# Patient Record
Sex: FEMALE | Race: WHITE | NOT HISPANIC OR LATINO | Employment: FULL TIME | URBAN - METROPOLITAN AREA
[De-identification: names, ages, dates, MRNs, and addresses within clinical notes are randomized per-mention and may not be internally consistent; named-entity substitution may affect disease eponyms.]

---

## 2017-06-26 ENCOUNTER — COMMUNICATION - HEALTHEAST (OUTPATIENT)
Dept: FAMILY MEDICINE | Facility: CLINIC | Age: 32
End: 2017-06-26

## 2017-08-30 ENCOUNTER — COMMUNICATION - HEALTHEAST (OUTPATIENT)
Dept: FAMILY MEDICINE | Facility: CLINIC | Age: 32
End: 2017-08-30

## 2017-09-01 ENCOUNTER — COMMUNICATION - HEALTHEAST (OUTPATIENT)
Dept: FAMILY MEDICINE | Facility: CLINIC | Age: 32
End: 2017-09-01

## 2017-10-26 ENCOUNTER — OFFICE VISIT - HEALTHEAST (OUTPATIENT)
Dept: FAMILY MEDICINE | Facility: CLINIC | Age: 32
End: 2017-10-26

## 2017-10-26 DIAGNOSIS — Z23 NEED FOR VACCINATION: ICD-10-CM

## 2017-10-26 DIAGNOSIS — D22.9 CHANGE IN MOLE: ICD-10-CM

## 2017-10-26 DIAGNOSIS — E66.3 OVERWEIGHT (BMI 25.0-29.9): ICD-10-CM

## 2017-10-26 DIAGNOSIS — R21 RASH: ICD-10-CM

## 2017-10-26 DIAGNOSIS — Z00.00 ANNUAL PHYSICAL EXAM: ICD-10-CM

## 2017-10-26 ASSESSMENT — MIFFLIN-ST. JEOR: SCORE: 1450.44

## 2017-10-27 LAB
CHOLEST SERPL-MCNC: 208 MG/DL
FASTING STATUS PATIENT QL REPORTED: NO
HDLC SERPL-MCNC: 54 MG/DL
LDLC SERPL CALC-MCNC: 134 MG/DL
TRIGL SERPL-MCNC: 98 MG/DL

## 2017-10-31 ENCOUNTER — COMMUNICATION - HEALTHEAST (OUTPATIENT)
Dept: FAMILY MEDICINE | Facility: CLINIC | Age: 32
End: 2017-10-31

## 2017-11-08 ENCOUNTER — COMMUNICATION - HEALTHEAST (OUTPATIENT)
Dept: ONCOLOGY | Facility: HOSPITAL | Age: 32
End: 2017-11-08

## 2017-11-20 ENCOUNTER — COMMUNICATION - HEALTHEAST (OUTPATIENT)
Dept: FAMILY MEDICINE | Facility: CLINIC | Age: 32
End: 2017-11-20

## 2017-11-20 ENCOUNTER — COMMUNICATION - HEALTHEAST (OUTPATIENT)
Dept: ONCOLOGY | Facility: HOSPITAL | Age: 32
End: 2017-11-20

## 2017-12-22 ENCOUNTER — RECORDS - HEALTHEAST (OUTPATIENT)
Dept: ADMINISTRATIVE | Facility: OTHER | Age: 32
End: 2017-12-22

## 2018-03-14 ENCOUNTER — RECORDS - HEALTHEAST (OUTPATIENT)
Dept: LAB | Facility: CLINIC | Age: 33
End: 2018-03-14

## 2018-03-16 LAB — BACTERIA SPEC CULT: NORMAL

## 2018-05-22 ENCOUNTER — OFFICE VISIT - HEALTHEAST (OUTPATIENT)
Dept: FAMILY MEDICINE | Facility: CLINIC | Age: 33
End: 2018-05-22

## 2018-05-22 DIAGNOSIS — Z30.09 ENCOUNTER FOR OTHER GENERAL COUNSELING OR ADVICE ON CONTRACEPTION: ICD-10-CM

## 2018-05-22 ASSESSMENT — MIFFLIN-ST. JEOR: SCORE: 1505.16

## 2018-10-27 ENCOUNTER — AMBULATORY - HEALTHEAST (OUTPATIENT)
Dept: NURSING | Facility: CLINIC | Age: 33
End: 2018-10-27

## 2018-10-28 ENCOUNTER — COMMUNICATION - HEALTHEAST (OUTPATIENT)
Dept: FAMILY MEDICINE | Facility: CLINIC | Age: 33
End: 2018-10-28

## 2019-02-15 ENCOUNTER — RECORDS - HEALTHEAST (OUTPATIENT)
Dept: ADMINISTRATIVE | Facility: OTHER | Age: 34
End: 2019-02-15

## 2019-03-11 ENCOUNTER — RECORDS - HEALTHEAST (OUTPATIENT)
Dept: ADMINISTRATIVE | Facility: OTHER | Age: 34
End: 2019-03-11

## 2019-03-19 ENCOUNTER — COMMUNICATION - HEALTHEAST (OUTPATIENT)
Dept: FAMILY MEDICINE | Facility: CLINIC | Age: 34
End: 2019-03-19

## 2019-05-09 ENCOUNTER — OFFICE VISIT - HEALTHEAST (OUTPATIENT)
Dept: FAMILY MEDICINE | Facility: CLINIC | Age: 34
End: 2019-05-09

## 2019-05-09 DIAGNOSIS — Z13.1 SCREENING FOR DIABETES MELLITUS: ICD-10-CM

## 2019-05-09 DIAGNOSIS — G43.109 MIGRAINE WITH AURA AND WITHOUT STATUS MIGRAINOSUS, NOT INTRACTABLE: ICD-10-CM

## 2019-05-09 DIAGNOSIS — Z13.6 SCREENING FOR CARDIOVASCULAR CONDITION: ICD-10-CM

## 2019-05-09 DIAGNOSIS — Z00.00 ROUTINE ADULT HEALTH MAINTENANCE: ICD-10-CM

## 2019-05-09 DIAGNOSIS — Z30.41 ENCOUNTER FOR SURVEILLANCE OF CONTRACEPTIVE PILLS: ICD-10-CM

## 2019-06-04 ENCOUNTER — COMMUNICATION - HEALTHEAST (OUTPATIENT)
Dept: FAMILY MEDICINE | Facility: CLINIC | Age: 34
End: 2019-06-04

## 2019-06-04 DIAGNOSIS — Z30.09 ENCOUNTER FOR OTHER GENERAL COUNSELING OR ADVICE ON CONTRACEPTION: ICD-10-CM

## 2020-03-20 ENCOUNTER — COMMUNICATION - HEALTHEAST (OUTPATIENT)
Dept: SCHEDULING | Facility: CLINIC | Age: 35
End: 2020-03-20

## 2020-05-16 ENCOUNTER — COMMUNICATION - HEALTHEAST (OUTPATIENT)
Dept: FAMILY MEDICINE | Facility: CLINIC | Age: 35
End: 2020-05-16

## 2020-05-16 DIAGNOSIS — Z30.09 ENCOUNTER FOR OTHER GENERAL COUNSELING OR ADVICE ON CONTRACEPTION: ICD-10-CM

## 2020-05-29 ENCOUNTER — COMMUNICATION - HEALTHEAST (OUTPATIENT)
Dept: FAMILY MEDICINE | Facility: CLINIC | Age: 35
End: 2020-05-29

## 2020-07-21 ENCOUNTER — COMMUNICATION - HEALTHEAST (OUTPATIENT)
Dept: FAMILY MEDICINE | Facility: CLINIC | Age: 35
End: 2020-07-21

## 2020-07-21 DIAGNOSIS — Z13.1 SCREENING FOR DIABETES MELLITUS: ICD-10-CM

## 2020-07-21 DIAGNOSIS — Z13.6 SCREENING FOR CARDIOVASCULAR CONDITION: ICD-10-CM

## 2020-07-23 ENCOUNTER — OFFICE VISIT - HEALTHEAST (OUTPATIENT)
Dept: FAMILY MEDICINE | Facility: CLINIC | Age: 35
End: 2020-07-23

## 2020-07-23 ENCOUNTER — AMBULATORY - HEALTHEAST (OUTPATIENT)
Dept: LAB | Facility: CLINIC | Age: 35
End: 2020-07-23

## 2020-07-23 DIAGNOSIS — Z13.6 SCREENING FOR CARDIOVASCULAR CONDITION: ICD-10-CM

## 2020-07-23 DIAGNOSIS — G43.109 MIGRAINE WITH AURA AND WITHOUT STATUS MIGRAINOSUS, NOT INTRACTABLE: ICD-10-CM

## 2020-07-23 DIAGNOSIS — Z12.4 SCREENING FOR MALIGNANT NEOPLASM OF CERVIX: ICD-10-CM

## 2020-07-23 DIAGNOSIS — Z13.1 SCREENING FOR DIABETES MELLITUS: ICD-10-CM

## 2020-07-23 DIAGNOSIS — E66.811 CLASS 1 OBESITY WITHOUT SERIOUS COMORBIDITY WITH BODY MASS INDEX (BMI) OF 34.0 TO 34.9 IN ADULT, UNSPECIFIED OBESITY TYPE: ICD-10-CM

## 2020-07-23 DIAGNOSIS — Z00.00 ROUTINE ADULT HEALTH MAINTENANCE: ICD-10-CM

## 2020-07-23 LAB
CHOLEST SERPL-MCNC: 206 MG/DL
FASTING STATUS PATIENT QL REPORTED: YES
FASTING STATUS PATIENT QL REPORTED: YES
GLUCOSE BLD-MCNC: 74 MG/DL (ref 79–116)
HDLC SERPL-MCNC: 54 MG/DL
LDLC SERPL CALC-MCNC: 118 MG/DL
TRIGL SERPL-MCNC: 171 MG/DL

## 2020-07-23 ASSESSMENT — ANXIETY QUESTIONNAIRES
7. FEELING AFRAID AS IF SOMETHING AWFUL MIGHT HAPPEN: NOT AT ALL
1. FEELING NERVOUS, ANXIOUS, OR ON EDGE: SEVERAL DAYS
2. NOT BEING ABLE TO STOP OR CONTROL WORRYING: NOT AT ALL
6. BECOMING EASILY ANNOYED OR IRRITABLE: NOT AT ALL
GAD7 TOTAL SCORE: 4
4. TROUBLE RELAXING: SEVERAL DAYS
5. BEING SO RESTLESS THAT IT IS HARD TO SIT STILL: SEVERAL DAYS
IF YOU CHECKED OFF ANY PROBLEMS ON THIS QUESTIONNAIRE, HOW DIFFICULT HAVE THESE PROBLEMS MADE IT FOR YOU TO DO YOUR WORK, TAKE CARE OF THINGS AT HOME, OR GET ALONG WITH OTHER PEOPLE: NOT DIFFICULT AT ALL
3. WORRYING TOO MUCH ABOUT DIFFERENT THINGS: SEVERAL DAYS

## 2020-07-27 LAB
HPV SOURCE: NORMAL
HUMAN PAPILLOMA VIRUS 16 DNA: NEGATIVE
HUMAN PAPILLOMA VIRUS 18 DNA: NEGATIVE
HUMAN PAPILLOMA VIRUS FINAL DIAGNOSIS: NORMAL
HUMAN PAPILLOMA VIRUS OTHER HR: NEGATIVE
SPECIMEN DESCRIPTION: NORMAL

## 2020-08-05 LAB
BKR LAB AP ABNORMAL BLEEDING: NO
BKR LAB AP BIRTH CONTROL/HORMONES: NORMAL
BKR LAB AP CERVICAL APPEARANCE: NORMAL
BKR LAB AP GYN ADEQUACY: NORMAL
BKR LAB AP GYN INTERPRETATION: NORMAL
BKR LAB AP HPV REFLEX: NORMAL
BKR LAB AP LMP: NORMAL
BKR LAB AP PATIENT STATUS: NORMAL
BKR LAB AP PREVIOUS ABNORMAL: NORMAL
BKR LAB AP PREVIOUS NORMAL: 2016
HIGH RISK?: NO
PATH REPORT.COMMENTS IMP SPEC: NORMAL
RESULT FLAG (HE HISTORICAL CONVERSION): NORMAL

## 2020-10-13 ENCOUNTER — AMBULATORY - HEALTHEAST (OUTPATIENT)
Dept: NURSING | Facility: CLINIC | Age: 35
End: 2020-10-13

## 2021-01-25 ENCOUNTER — OFFICE VISIT - HEALTHEAST (OUTPATIENT)
Dept: FAMILY MEDICINE | Facility: CLINIC | Age: 36
End: 2021-01-25

## 2021-01-25 ENCOUNTER — COMMUNICATION - HEALTHEAST (OUTPATIENT)
Dept: FAMILY MEDICINE | Facility: CLINIC | Age: 36
End: 2021-01-25

## 2021-01-25 DIAGNOSIS — N89.8 VAGINAL DISCHARGE: ICD-10-CM

## 2021-01-25 LAB
CLUE CELLS: NORMAL
TRICHOMONAS, WET PREP: NORMAL
YEAST, WET PREP: NORMAL

## 2021-01-25 ASSESSMENT — MIFFLIN-ST. JEOR: SCORE: 1552.51

## 2021-01-26 ENCOUNTER — COMMUNICATION - HEALTHEAST (OUTPATIENT)
Dept: FAMILY MEDICINE | Facility: CLINIC | Age: 36
End: 2021-01-26

## 2021-01-26 DIAGNOSIS — N76.0 BACTERIAL VAGINOSIS: ICD-10-CM

## 2021-01-26 DIAGNOSIS — B96.89 BACTERIAL VAGINOSIS: ICD-10-CM

## 2021-05-28 ASSESSMENT — ANXIETY QUESTIONNAIRES: GAD7 TOTAL SCORE: 4

## 2021-05-28 NOTE — PROGRESS NOTES
Assessment & Plan:        1. Routine adult health maintenance    2. Migraine with aura and without status migrainosus, not intractable  - HM2(CBC w/o Differential); Future    3. Screening for cardiovascular condition     Will return to clinic for fasting labs.  - Lipid Cascade; Future    4. Screening for diabetes mellitus  - Glucose; Future    5. Encounter for surveillance of contraceptive pills    6. Contraception      Reviewed family planning and options for long-term contraception if she desired.   - norethindrone (DEBLITANE) 0.35 mg tablet; Take 1 tablet (0.35 mg total) by mouth daily.  Dispense: 84 tablet; Refill: 3       Patient Counseling:    --Nutrition: Stressed importance of moderation in sodium/caffeine intake, saturated fat and cholesterol, caloric balance, sufficient intake of fresh fruits, vegetables, calcium, and iron.    --Discussed the importance of vitamin D and calcium supplementation/intake, and the risks and benefits of daily use of baby aspirin.   --Family planning:reviewed contraceptive options, supplementing with folate and DHA and review of prescription medications when considering pregnancy.   --Exercise: Stressed the importance of regular weight-bearing exercise    --Substance Abuse: limit alcohol use    --Injury prevention: Discussed safety belts, distracted driving, fire safety    --Dental health: Discussed importance of regular tooth brushing, flossing, and dental visits.    --Discussed pap frequency and indications for stopping screening.   --Discussed risks and benefits of regular breast self exams and evaluation with any changes    --Immunizations reviewed        Discussed the patient's BMI with her.  The BMI is above average; BMI management plan is completed      The following high BMI interventions were performed this visit: encouragement to exercise and lifestyle education regarding diet     Subjective:          Michell Whiting is a 33 y.o. female who presents for annual  exam.   The patient reports no concerns.       Fasting today? No       Has the patient ever been transfused or tattooed?: no.      Do you have pain that bothers you in your daily life? no       The patient reports that there is not domestic violence in her life.     Gynecologic History     LMP: 2019  Contraception: OCP (estrogen/progesterone)  The patient is sexually active.  Last Pap: 2019 Results were: normal  Abnormal pap history? Yes, s/p LEEP, > 10 yrs ago  Last mammogram: none.   : 1  Para: 1001    Healthy Habits:   Regular Exercise: No  Sunscreen Use: Yes  Healthy Diet: No  Dental Visits Regularly: Yes  Seat Belt: Yes  Sexually active: Yes  Self Breast Exam Monthly:Yes  Colonoscopy: No   Lipid Profile: No  Glucose Screen: No  Prevention of Osteoporosis: N/A  Last Dexa: N/A  Guns at Home:  No    Immunization History   Administered Date(s) Administered     Influenza, inj, historic,unspecified 10/13/2015, 2016, 10/26/2017     Influenza, seasonal,quad inj 36+ mos 10/26/2017, 10/27/2018     Influenza, seasonal,quad inj 6-35 mos 2016     Influenza,seasonal quad, PF, 36+MOS 10/15/2014     MMR 08/10/2016     Tdap 2016     Immunization status: up to date and documented.    The following portions of the patient's history were reviewed and updated as appropriate: allergies, current medications, past family history, past medical history, past social history, past surgical history and problem list.    Review of Systems  A 12 point comprehensive review of systems was negative except as noted.    No vag itch or discharge  Cycles regular and normal. Migraine with aura - told not to take estrogen-containing OCPs  Debating about another pregnancy - daughter was very fussy       Objective:        Vitals:    19 1526   BP: 122/62   Pulse: 88   SpO2: 98%   Weight: 185 lb 9 oz (84.2 kg)      Body mass index is 32.36 kg/m .  General: alert, pleasant, and no distress  Head: Normocephalic,  without obvious abnormality, atraumatic  Eyes: conjunctivae and sclerae normal and pupils equal, round, reactive to   light and accomodation  Ears: normal TM's and external ear canals both ears  Nose: no discharge, no sinus tenderness  Throat: lips, mucosa, and tongue normal; teeth and gums normal  Neck: no adenopathy, supple, symmetrical, trachea midline and thyroid normal  Back: symmetric, ROM normal. No CVA tenderness.  Lungs: clear to auscultation bilaterally  Breasts: normal appearance, no masses or tenderness  Heart : regular rate and rhythm and no murmurs  Abdomen:  bowel sounds normal, no masses palpable and soft, non-tender  Pelvic: exam deferred  Extremities: extremities normal, atraumatic, no cyanosis or edema  Pulses: 2+ and symmetric  Skin: Skin color, texture, turgor normal. No rashes or lesions  Lymph nodes: Cervical, supraclavicular, and axillary nodes normal.  Neurologic: Grossly normal

## 2021-05-31 VITALS — HEIGHT: 64 IN | WEIGHT: 170.5 LBS | BODY MASS INDEX: 29.11 KG/M2

## 2021-06-01 VITALS — HEIGHT: 64 IN | BODY MASS INDEX: 31.17 KG/M2 | WEIGHT: 182.56 LBS

## 2021-06-03 VITALS — WEIGHT: 185.56 LBS | BODY MASS INDEX: 32.36 KG/M2

## 2021-06-04 VITALS
DIASTOLIC BLOOD PRESSURE: 72 MMHG | SYSTOLIC BLOOD PRESSURE: 124 MMHG | WEIGHT: 189.31 LBS | HEART RATE: 83 BPM | BODY MASS INDEX: 34.63 KG/M2 | OXYGEN SATURATION: 98 %

## 2021-06-05 VITALS
HEIGHT: 62 IN | DIASTOLIC BLOOD PRESSURE: 78 MMHG | WEIGHT: 198.25 LBS | BODY MASS INDEX: 36.48 KG/M2 | HEART RATE: 67 BPM | OXYGEN SATURATION: 98 % | SYSTOLIC BLOOD PRESSURE: 120 MMHG

## 2021-06-07 NOTE — TELEPHONE ENCOUNTER
"Patient calling - says all day she has had \"bad pain in lower right calf.\"  Says it feels like there is a lot of pressure in that lower leg. Rates pain 7-8/10    No chest pain.  No difficulty breathing.  No known injury.  No swelling.  No redness or warmth.      Triaged to disposition of See Physician Within 4 Hours.  Advised patient ED is only option at this time.      Althea Santiago RN  Triage Nurse Advisor    Reason for Disposition    [1] Thigh or calf pain AND [2] only 1 side AND [3] present > 1 hour    Protocols used: LEG PAIN-A-AH      "

## 2021-06-08 NOTE — TELEPHONE ENCOUNTER
RN cannot approve Refill Request    RN can NOT refill this medication med is not covered by policy/route to provider. Last office visit: 5/22/2018 Anastacia Herrera MD Last Physical: 5/9/2019 Last MTM visit: Visit date not found Last visit same specialty: 5/22/2018 Anastacia Herrera MD.  Next visit within 3 mo: Visit date not found  Next physical within 3 mo: Visit date not found      Althea Santiago, [facility] Triage/Med Refill 5/17/2020    Requested Prescriptions   Pending Prescriptions Disp Refills     norethindrone (MICRONOR) 0.35 mg tablet [Pharmacy Med Name: NORETHINDRONE 0.35MG TABS] 84 tablet 3     Sig: TAKE ONE TABLET BY MOUTH EVERY DAY       There is no refill protocol information for this order

## 2021-06-09 NOTE — PROGRESS NOTES
Assessment & Plan:        1. Routine adult health maintenance    2. Screening for malignant neoplasm of cervix  - Gynecologic Cytology (PAP Smear)  - HPV High Risk DNA Cervical    3. Migraine with aura and without status migrainosus, not intractable      She will continue the imitrex prn and follow up with any change in symptoms.    4. Class 1 obesity without serious comorbidity with body mass index (BMI) of 34.0 to 34.9 in adult, unspecified obesity type       Patient Counseling:    --Nutrition: Stressed importance of moderation in sodium/caffeine intake, saturated fat and cholesterol, caloric balance, sufficient intake of fresh fruits, vegetables, calcium, and iron.    --Discussed the importance of vitamin D and calcium supplementation/intake, and the risks and benefits of daily use of baby aspirin.   --Family planning:reviewed contraceptive options, supplementing with folate and DHA and review of prescription medications when considering pregnancy.   --Exercise: Stressed the importance of regular weight-bearing exercise    --Substance Abuse: limit alcohol use    --Injury prevention: Discussed safety belts, distracted driving, fire safety    --Dental health: Discussed importance of regular tooth brushing, flossing, and dental visits.    --Discussed pap frequency and indications for stopping screening.   --Discussed risks and benefits of regular breast self exams and evaluation with any changes    --Immunizations reviewed   --Advances Directives were reviewed and she was given a copy of the Honoring Choices Document.       Discussed the patient's BMI with her.  The BMI is above average; BMI management plan is completed      I have had an Advance Directives discussion with the patient.  The following high BMI interventions were performed this visit: encouragement to exercise and lifestyle education regarding diet     Subjective:          Michell Whiting is a 35 y.o. female who presents for annual exam.   The  patient reports no concerns.       Fasting today? No       Has the patient ever been transfused or tattooed?: no.      Do you have pain that bothers you in your daily life? no       The patient reports that there is not domestic violence in her life.     Gynecologic History     LMP: 2020  Contraception: none. Off minipill since spring - cycles more regular since stopping.   Considering another pregnancy. Daughter was a difficult baby. No sig issues as toddler.   The patient is sexually active.   Last Pap: 2016. Results were: normal  Abnormal pap history? yes  Last mammogram: n/a. Results were: n/a  : 0  Para: 0    Healthy Habits:   Regular Exercise: Yes  Sunscreen Use: Yes  Healthy Diet: Yes  Dental Visits Regularly: Yes  Seat Belt: Yes  Sexually active: Yes  Self Breast Exam Monthly:Yes  Colonoscopy: No  Lipid Profile: Yes  Glucose Screen: Yes  Prevention of Osteoporosis: No  Last Dexa: No  Guns at Home:  No        Immunization History   Administered Date(s) Administered     Influenza, inj, historic,unspecified 10/13/2015, 2016, 10/26/2017     Influenza, seasonal,quad inj 6-35 mos 2016     Influenza,seasonal quad, PF, =/> 6months 10/15/2014     Influenza,seasonal,quad inj =/> 6months 10/26/2017, 10/27/2018, 10/29/2019     MMR 08/10/2016     Tdap 2016     Immunization status: up to date and documented.    The following portions of the patient's history were reviewed and updated as appropriate: allergies, current medications, past family history, past medical history, past social history, past surgical history and problem list.    Review of Systems  A 12 point comprehensive review of systems was negative except as noted.     Objective:        Vitals:    20 1449   BP: 124/72   Pulse: 83   SpO2: 98%   Weight: 189 lb 5 oz (85.9 kg)      Body mass index is 34.63 kg/m .  General: alert, pleasant, and no distress  Head: Normocephalic, without obvious abnormality, atraumatic  Eyes:  conjunctivae and sclerae normal and pupils equal, round, reactive to   light and accomodation  Ears: normal TM's and external ear canals both ears  Nose: no discharge, no sinus tenderness  Throat: lips, mucosa, and tongue normal; teeth and gums normal  Neck: no adenopathy, supple, symmetrical, trachea midline and thyroid normal  Back: symmetric, ROM normal. No CVA tenderness.  Lungs: clear to auscultation bilaterally  Breasts: normal appearance, no masses or tenderness  Heart : regular rate and rhythm and no murmurs  Abdomen:  bowel sounds normal, no masses palpable and soft, non-tender  Pelvic: external genitalia normal,  vagina normal without discharge, cervix normal in appearance,   no cervical motion tenderness.  Extremities: extremities normal, atraumatic, no cyanosis or edema  Pulses: 2+ and symmetric  Skin: Skin color, texture, turgor normal. No rashes or lesions  Lymph nodes: Cervical, supraclavicular, and axillary nodes normal.  Neurologic: Grossly normal         Results for orders placed or performed in visit on 07/23/20   Gynecologic Cytology (PAP Smear)   Result Value Ref Range    Case Report       Gynecologic Cytology Report                       Case: [id number]                                   Authorizing Provider:  Anastacia Herrera MD Collected:           07/23/2020 1500              Ordering Location:     [facility]       Received:            07/24/2020 1039                                     Family Medicine/OB                                                           First Screen:          Hallie Duncan, CT                                                                               (ASCP)                                                                       Specimen:    SUREPATH PAP, SCREENING, Endocervical/cervical                                             Interpretation  Negative for squamous intraepithelial lesion or malignancy.      Negative for squamous  intraepithelial lesion or malignancy    Result Flag Normal Normal    Specimen Adequacy       Satisfactory for evaluation, endocervical/transformation zone component present    HPV Reflex? Yes regardless of result     HIGH RISK No     LMP/Menopause Date early July     Abnormal Bleeding No     Pt Status n/a     Birth Control/Hormones None     Previous Normal/Date 2016     Prev Abn Date/Dx 2012, s/p LEEP     Cervical Appearance Normal    HPV High Risk DNA Cervical   Result Value Ref Range    HPV Source SurePath     HPV16 DNA Negative NEG    HPV18 DNA Negative NEG    Other HR HPV Negative NEG    Final Diagnosis SEE NOTES     Specimen Description Cervical Cells

## 2021-06-13 NOTE — PROGRESS NOTES
Michell Whiting is a 32 y.o. female is here for a  Health Maintenance exam.  She works full-time as a .  She lives with her  Roberth may have one child.  She does not drink alcohol, smoke cigarettes and denies illicit drug use.  She does not exercise.  Family, medical and surgical history reviewed with patient.  Medication list reconciled today.  She is requesting a referral to dermatology for a complete mole check.  She does sweat excessively in her hands and feet and would like to discuss this with dermatology as well she has been breaking out from her wedding ring and experiencing a rash on the ring finger.  This did not occur until after she had her first child in August 2016.  She does experience migraines with aura and has not had any since giving birth to her child.  She has been evaluated in the past by neurology for her migraines with aura and placed on Imitrex.  She expresses concerns to me about a genetic disorder in her half-sister.  She tells me her half sister called her panicking and said that her and her child need to be tested for the anti-trypsin gene.  I did look up information for her on up-to-date and had a difficult time finding the exact sort of testing that she would need being that generics is not my specialty.  I instructed her to follow-up with a genetic specialist and speak with them to see if additional testing is necessary since it is her half-sister.  I did go ahead and place the order for a consult to discuss her concerns.  Will follow up with her if I run into any difficulty with scheduling this appointment.    1. Annual physical exam  Thyroid Stimulating Hormone (TSH)    Hemoglobin    Comprehensive Metabolic Panel    Lipid Cascade RANDOM    Vitamin D, Total (25-Hydroxy)   2. Rash  triamcinolone (KENALOG) 0.025 % ointment    Ambulatory referral to Dermatology   3. Change in mole  Ambulatory referral to Dermatology   4. Reason for consultation  Ambulatory referral  to Genetics   5. Overweight (BMI 25.0-29.9)     6. Need for vaccination  Influenza, Seasonal,Quad Inj, 36+ MOS         Healthy Habits:   Regular Exercise: No, discussed  Sunscreen Use: Yes  Healthy Diet: Yes, B: yogurt and granola, L: chips, hot dog, fruit, D: meat, carb and veggie  Dental Visits Regularly: Yes  Seat Belt: Yes  Sexually active: Yes  Self Breast Exam Monthly:Yes  Hemoccults: No  Flex Sig: No  Colonoscopy: No  Lipid Profile: No  Glucose Screen: No  Prevention of Osteoporosis: Yes  Last Dexa: No  Guns at Home:  No  Domestic Violence:  No    Current Outpatient Medications Include:    Current Outpatient Prescriptions:      norethindrone (MICRONOR) 0.35 mg tablet, Take 1 tablet (0.35 mg total) by mouth daily., Disp: 84 tablet, Rfl: 0     prenatal #115-iron-folic acid 29 mg iron- 1 mg Chew, Chew daily., Disp: , Rfl:      SUMAtriptan (IMITREX) 100 MG tablet, Take 100 mg by mouth every 2 (two) hours as needed for migraine., Disp: , Rfl:      famotidine (PEPCID) 20 MG tablet, Take 1 tablet (20 mg total) by mouth 2 (two) times a day., Disp: 60 tablet, Rfl: 0     lanolin (LANSINOH HPA) 100 % Oint, Apply bid-tid prn nipple irritation, Disp: , Rfl: 0     triamcinolone (KENALOG) 0.025 % ointment, Apply topically 2 (two) times a day., Disp: 30 g, Rfl: 0    Allergies:  No Known Allergies    Past Medical History:   Diagnosis Date     Abnormal Pap smear of cervix     Did have a colposcopy and cryotherapy for this     Migraine        Past Surgical History:   Procedure Laterality Date     CERVIX LESION DESTRUCTION       WISDOM TOOTH EXTRACTION         OB History    Para Term  AB Living   1 1 1   1   SAB TAB Ectopic Multiple Live Births      0 1      # Outcome Date GA Lbr Ted/2nd Weight Sex Delivery Anes PTL Lv   1 Term 16 39w5d 10:07 / 00:43 6 lb 9 oz (2.977 kg) F Vag-Spont EPI N SORAYA          Immunization History   Administered Date(s) Administered     Influenza, seasonal,quad inj 36+ mos 10/26/2017      Influenza, seasonal,quad inj 6-35 mos 09/22/2016     MMR 08/10/2016     Tdap 06/02/2016       Family History   Problem Relation Age of Onset     Hypertension Mother      Diabetes Mother 50     Hyperlipidemia Mother      Mental illness Father      Alcohol abuse Father      Hypertension Maternal Grandmother      Hyperlipidemia Maternal Grandmother      Diabetes Maternal Grandmother      Stroke Maternal Grandmother        Social History     Social History     Marital status:      Spouse name: Roberth     Number of children: 1     Years of education: 18     Occupational History           Social History Main Topics     Smoking status: Never Smoker     Smokeless tobacco: Never Used     Alcohol use No     Drug use: No     Sexual activity: Yes     Partners: Male     Other Topics Concern     Not on file     Social History Narrative       Last cholesterol:   Lab Results   Component Value Date    CHOL 208 (H) 10/26/2017     Lab Results   Component Value Date    HDL 54 10/26/2017     Lab Results   Component Value Date    LDLCALC 134 (H) 10/26/2017     Lab Results   Component Value Date    TRIG 98 10/26/2017     No components found for: CHOLHDL    MAMMO:N/A      Birth Control Method: OCP  High Risk/Behavior: No      LMP: No LMP recorded. 2015, on mini pill  Menstrual Regularity:   Flow:       Review of Systems:   General:  Denies fever, chills, HA, fatigue, myalgias, weight change    Eyes: Denies vision changes   Ears/Nose/Throat: Denies nasal congestion, rhinorrhea, ear pain or discharge, sore throat, swollen glands  Cardiovascular: Denies CP, palpitations  Respiratory:  Denies SOB, cough  Gastrointestinal:  Denies changes in bowel habits, melena, rectal bleeding,  Genitourinary: Denies changes in urine habits/frequency/dysuria, hematuria   Musculoskeletal:  Denies  joint pain or swelling or erythema, edema  Skin:  Neurologic: Denies weakness, paresthesia  Psychiatric: Denies mood changes   Endocrine:  "Denies polyuria, polydipsia, polyphagia  Heme/Lymphatic: Denies problem with bleeding   Allergic/Immunologic: Denies problem     POSITIVES: rash, sweating, moles      PHYSICAL EXAM:    /72  Pulse 62  Resp 14  Ht 5' 3.5\" (1.613 m)  Wt 170 lb 8 oz (77.3 kg)  SpO2 98%  Breastfeeding? No  BMI 29.73 kg/m2    Wt Readings from Last 3 Encounters:   10/26/17 170 lb 8 oz (77.3 kg)   12/08/16 174 lb 7 oz (79.1 kg)   09/22/16 177 lb 9 oz (80.5 kg)       Body mass index is 29.73 kg/(m^2).    Well developed, well nourished, no acute distress.  HEENT: normocephalic/atraumatic, PERRLA/EOMI, TMs: Gray, normal light reflex, no nasal discharge.  Oral mucosa: no erythema/exudate  Neck: No LAD/masses/thyromegaly/bruits  Lungs: clear bilaterally  Heart: regular rate and rhythm, no murmurs/gallops/rubs  Breasts: symmetric, no masses/skin changes, nipple discharge, or axillary LAD.  BSE reviewed.  Abdomen: Normal bowel sounds, soft, non-tender, non-distended, no masses, neg Gill's/McBurney's, no rebound/guarding  Genital: Normal external genitalia, no discharge, no lesions, pelvic exam normal, no abnormal bleeding or masses  Rectal: internal exam is deferred.  External exam is normal.  Lymphatics: no supraclavicular/axillary/epitrochlear/inguinal LAD. No edema.  Neuro: A&O x 3, CN II-XII intact, strength 5/5, reflexes symmetric, sensory intact to light touch.  Psych: Behavior appropriate, engaging.  Thought processes congruent, non-tangential.  Musculoskeletal: no gross deformities.  Skin: no rashes or lesions. Moles appear normal      Recent Results (from the past 240 hour(s))   Thyroid Stimulating Hormone (TSH)   Result Value Ref Range    TSH 1.46 0.30 - 5.00 uIU/mL   Hemoglobin   Result Value Ref Range    Hemoglobin 13.3 12.0 - 16.0 g/dL   Comprehensive Metabolic Panel   Result Value Ref Range    Sodium 140 136 - 145 mmol/L    Potassium 4.3 3.5 - 5.0 mmol/L    Chloride 104 98 - 107 mmol/L    CO2 26 22 - 31 mmol/L    Anion " Gap, Calculation 10 5 - 18 mmol/L    Glucose 77 70 - 125 mg/dL    BUN 15 8 - 22 mg/dL    Creatinine 0.77 0.60 - 1.10 mg/dL    GFR MDRD Af Amer >60 >60 mL/min/1.73m2    GFR MDRD Non Af Amer >60 >60 mL/min/1.73m2    Bilirubin, Total 0.7 0.0 - 1.0 mg/dL    Calcium 9.4 8.5 - 10.5 mg/dL    Protein, Total 7.3 6.0 - 8.0 g/dL    Albumin 4.0 3.5 - 5.0 g/dL    Alkaline Phosphatase 98 45 - 120 U/L    AST 20 0 - 40 U/L    ALT 26 0 - 45 U/L   Lipid Cascade RANDOM   Result Value Ref Range    Cholesterol 208 (H) <=199 mg/dL    Triglycerides 98 <=149 mg/dL    HDL Cholesterol 54 >=50 mg/dL    LDL Calculated 134 (H) <=129 mg/dL    Patient Fasting > 8hrs? No        ASSESSMENT/PLAN: 32 y.o. female physical exam and pelvic exam      The following high BMI interventions were performed this visit: encouragement to exercise and weight monitoring    1. Annual physical exam    - Thyroid Stimulating Hormone (TSH)  - Hemoglobin  - Comprehensive Metabolic Panel  - Lipid Cascade RANDOM  - Vitamin D, Total (25-Hydroxy)    2. Rash    - triamcinolone (KENALOG) 0.025 % ointment; Apply topically 2 (two) times a day.  Dispense: 30 g; Refill: 0  - Ambulatory referral to Dermatology    3. Change in mole    - Ambulatory referral to Dermatology    4. Reason for consultation    - Ambulatory referral to Genetics t discuss anti-trypsin gene    5. Overweight (BMI 25.0-29.9)    -discussed, encouragement to exercise    6. Need for vaccination    - Influenza, Seasonal,Quad Inj, 36+ MOS      There are no discontinued medications.    Routine health maintenance discussion:  No smoking, limited alcohol (7 or less servings per week), 5 fruits/veg servings per day, 200 minutes of exercise per week.  Daily calcium/vitamin D guidelines, bone health, yearly mammogram after age 39/regular pap smears/colon cancer screening beginning at age 50.  Accident avoidance, sun screen.      Will contact her with the results of the labs when available.    Shelby Hamlin , CNP

## 2021-06-14 NOTE — PROGRESS NOTES
"OV   1/25/2021  Assessment:         1. Vaginal discharge  Wet Prep, Vaginal    metroNIDAZOLE (METROGEL) 1 % vaginal gel           Plan:         We reviewed the potential etiologies for her vaginal symptoms and labs were sent as noted above. Wet prep is negative, but given the appearance of the discharge and her symptoms, we will cover with metrogel vaginal as noted. We reviewed infection control measures, and she will call or return to clinic with any ongoing or worsening symptoms.        Subjective:             Michell Whiting is a 35 y.o. female who presents for evaluation of an abnormal vaginal discharge. Symptoms have been present for 2 mos. Vaginal symptoms: discharge described as watery, local irritation and odor. She denies abnormal bleeding, blisters, burning and dyspareunia. Associated symptoms: none. Denies arthralgias, chills, fever and myalgias.       Contraception: none. Sexually transmitted infection risk: very low risk of STD exposure. Menstrual flow: regular every 28-30 days.      The following portions of the patient's history were reviewed and updated as appropriate: allergies, current medications and problem list      Review of Systems  A 12 point comprehensive review of systems was negative except as noted.       Objective:         Vitals:    01/25/21 1628   BP: 120/78   Pulse: 67   SpO2: 98%   Weight: 198 lb 4 oz (89.9 kg)   Height: 5' 2\" (1.575 m)   LMP: 01/13/2021      Physical Exam:  General: Alert, cooperative, no distress  Head: Normocephalic, without obvious abnormality, atraumatic  Eyes:  conjunctiva/corneas clear, EOM's intact  Throat: Lips, mucosa, and tongue normal; teeth and gums normal  Neck: Supple, symmetrical, trachea midline, no  Lungs: Clear to auscultation bilaterally, respirations unlabored  Heart: Regular rate and rhythm,  no murmur, rub, or gallop,   Abdomen: Soft, non-tender, no masses, no organomegaly  Pelvic: external genitalia normal, scant thin, foamy vaginal " discharge, cervix normal in appearance,   no cervical motion tenderness    Extremities: Extremities normal, atraumatic, no cyanosis or edema  Skin: Skin color, texture, turgor normal, no rashes or lesions  Neurologic: Normal            Results for orders placed or performed in visit on 01/25/21   Wet Prep, Vaginal    Specimen: Genital   Result Value Ref Range    Yeast Result No yeast seen No yeast seen    Trichomonas No Trichomonas seen No Trichomonas seen    Clue Cells, Wet Prep No Clue cells seen No Clue cells seen

## 2021-06-14 NOTE — TELEPHONE ENCOUNTER
Reason for Call:  Medication or medication refill:    Do you use a [facility]?  Name of the pharmacy and phone number for the current request: [email or web address]  [facility] calling for the rx for the metronidazole vag meds  Name of the medication requested: metronidazole    Other request: rx went to [facility] and [facility] be [facility]  Can we leave a detailed message on this number? Yes    Phone number patient can be reached at: Other phone number:      Best Time:   Call taken on 1/26/2021 at 2:15 PM by Dee Ferrari

## 2021-06-14 NOTE — TELEPHONE ENCOUNTER
[facility] pharmacist calling to clarify metronidazole gel.     Is this supposed to be vaginal insertion?       Seen in person 1/25/2021. Prescribed Metronidazole gel 1%. Was this supposed to be 0.75% vaginal gel?

## 2021-06-16 PROBLEM — E66.811 CLASS 1 OBESITY WITHOUT SERIOUS COMORBIDITY WITH BODY MASS INDEX (BMI) OF 34.0 TO 34.9 IN ADULT, UNSPECIFIED OBESITY TYPE: Status: ACTIVE | Noted: 2020-08-09

## 2021-06-18 NOTE — PROGRESS NOTES
"Assessment:         1. Encounter for other general counseling or advice on contraception           Plan:         We reviewed her options for contraception/cycle control at length, and she is interested in continuing the oral progesterone-only contraceptive, but she will consider IUD vs nexplanon. She was given written information regarding both and will follow up for insertion once she's decided. We reviewed potential side effects at length, including mood changes, DVT, and hypertension, and she will call or return to clinic with any significant difficulties.         Subjective:         Michell Whiting is a 32 y.o. female who presents for counseling regarding contraception/cycle control. The patient is sexually active, and is about 9 mos postpartum and has been done with nursing for the last 6 mos or so. Patient describes history of migraine headaches (with aura), so has always avoided estrogen containing contraception. Symptoms occur erratically during the cycle. Her menstural cycles have been normal since she stopped nursing in January. Patient denies anxiety, labile mood and menstrual cramping. Pertinent past medical history: migraine with aura. Had done rhythm method in the past       Menstrual History:  OB History      Para Term  AB Living    1 1 1   1    SAB TAB Ectopic Multiple Live Births       0 1         The following portions of the patient's history were reviewed and updated as appropriate: allergies, current medications, past family history, past medical history, past social history, past surgical history and problem list.    Review of Systems  Pertinent items are noted in HPI.     Objective:          Vitals:    18 1603   BP: 110/66   Pulse: 64   Weight: 182 lb 9 oz (82.8 kg)   Height: 5' 3.5\" (1.613 m)       GEN: Alert and oriented, NAD, well nourished  SKIN:  Normal skin turgor, no lesions/rashes   HEENT: NC/AT, moist mucous membranes.    NECK: Normal.    CV: Regular rate and " rhythm.   LUNGS: Clear. Normal respirations.   EXTREMITY: No edema, cyanosis  NEURO: Grossly normal.

## 2021-08-10 ENCOUNTER — OFFICE VISIT (OUTPATIENT)
Dept: FAMILY MEDICINE | Facility: CLINIC | Age: 36
End: 2021-08-10
Payer: COMMERCIAL

## 2021-08-10 VITALS
BODY MASS INDEX: 34.99 KG/M2 | DIASTOLIC BLOOD PRESSURE: 72 MMHG | WEIGHT: 190.13 LBS | HEART RATE: 57 BPM | OXYGEN SATURATION: 98 % | HEIGHT: 62 IN | SYSTOLIC BLOOD PRESSURE: 118 MMHG

## 2021-08-10 DIAGNOSIS — Z13.6 SCREENING FOR CARDIOVASCULAR CONDITION: ICD-10-CM

## 2021-08-10 DIAGNOSIS — Z12.4 SCREENING FOR MALIGNANT NEOPLASM OF CERVIX: ICD-10-CM

## 2021-08-10 DIAGNOSIS — N89.8 VAGINAL DISCHARGE: ICD-10-CM

## 2021-08-10 DIAGNOSIS — Z11.59 NEED FOR HEPATITIS C SCREENING TEST: ICD-10-CM

## 2021-08-10 DIAGNOSIS — B37.31 YEAST VAGINITIS: ICD-10-CM

## 2021-08-10 DIAGNOSIS — Z00.00 ROUTINE ADULT HEALTH MAINTENANCE: Primary | ICD-10-CM

## 2021-08-10 LAB
CLUE CELLS: ABNORMAL
TRICHOMONAS, WET PREP: ABNORMAL
WBC'S/HIGH POWER FIELD, WET PREP: ABNORMAL
YEAST, WET PREP: PRESENT

## 2021-08-10 PROCEDURE — 99395 PREV VISIT EST AGE 18-39: CPT | Performed by: FAMILY MEDICINE

## 2021-08-10 PROCEDURE — 87624 HPV HI-RISK TYP POOLED RSLT: CPT | Performed by: FAMILY MEDICINE

## 2021-08-10 PROCEDURE — 87210 SMEAR WET MOUNT SALINE/INK: CPT | Performed by: FAMILY MEDICINE

## 2021-08-10 PROCEDURE — G0123 SCREEN CERV/VAG THIN LAYER: HCPCS | Performed by: FAMILY MEDICINE

## 2021-08-10 RX ORDER — FLUCONAZOLE 150 MG/1
150 TABLET ORAL ONCE
Qty: 2 TABLET | Refills: 1 | Status: SHIPPED | OUTPATIENT
Start: 2021-08-10 | End: 2021-08-10

## 2021-08-10 ASSESSMENT — MIFFLIN-ST. JEOR: SCORE: 1515.65

## 2021-08-10 NOTE — PROGRESS NOTES
SUBJECTIVE:   CC: Michell Whiting is an 36 year old woman who presents for preventive health visit.     Patient has been advised of split billing requirements and indicates understanding: Yes  Healthy Habits:     Getting at least 3 servings of Calcium per day:  Yes    Bi-annual eye exam:  Yes    Dental care twice a year:  Yes    Sleep apnea or symptoms of sleep apnea:  None    Diet:  Regular (no restrictions)    Frequency of exercise:  1 day/week    Duration of exercise:  Less than 15 minutes    Taking medications regularly:  Yes    Medication side effects:  Not applicable    PHQ-2 Total Score: 0    Additional concerns today:  No      Vaginal discharge and odor last spring, still some odor at times.     Using imitrex prn for migraines    Today's PHQ-2 Score:   PHQ-2 ( 1999 Pfizer) 8/10/2021   Q1: Little interest or pleasure in doing things 0   Q2: Feeling down, depressed or hopeless 0   PHQ-2 Score 0   Q1: Little interest or pleasure in doing things Not at all   Q2: Feeling down, depressed or hopeless Not at all   PHQ-2 Score 0       Abuse: Current or Past (Physical, Sexual or Emotional) - No  Do you feel safe in your environment? Yes        Social History     Tobacco Use     Smoking status: Never Smoker     Smokeless tobacco: Never Used   Substance Use Topics     Alcohol use: No     If you drink alcohol do you typically have >3 drinks per day or >7 drinks per week? No    Alcohol Use 8/10/2021   Prescreen: >3 drinks/day or >7 drinks/week? No       Reviewed orders with patient.  Reviewed health maintenance and updated orders accordingly - Yes      Breast Cancer Screening:  Any new diagnosis of family breast, ovarian, or bowel cancer? No    FHS-7: No flowsheet data found.    Patient under 40 years of age: Routine Mammogram Screening not recommended.   Pertinent mammograms are reviewed under the imaging tab.    History of abnormal Pap smear: NO - age 30-65 PAP every 5 years with negative HPV co-testing  "recommended  PAP / HPV Latest Ref Rng & Units 7/23/2020 1/29/2016   PAP Negative for squamous intraepithelial lesion or malignancy. Negative for squamous intraepithelial lesion or malignancy  Electronically signed by Hallie Duncan CT (ASCP) on 8/5/2020 at  2:03 PM   Negative for squamous intraepithelial lesion or malignancy  Electronically signed by Hallie Duncan CT (ASCP) on 2/4/2016 at  2:46 PM     HPV16 NEG Negative -   HPV18 NEG Negative -   HRHPV NEG Negative -     Reviewed and updated as needed this visit by clinical staff  Tobacco  Allergies  Meds   Med Hx  Surg Hx  Fam Hx  Soc Hx        Reviewed and updated as needed this visit by Provider                  Review of Systems  12 point ROS negative except as noted above        OBJECTIVE:   /72 (Patient Position: Sitting, Cuff Size: Adult Regular)   Pulse 57   Ht 1.575 m (5' 2\")   Wt 86.2 kg (190 lb 2 oz)   LMP 08/06/2021 (Exact Date)   SpO2 98%   BMI 34.77 kg/m    Physical Exam  General: alert, pleasant, and no distress  Head: Normocephalic, without obvious abnormality, atraumatic  Eyes: conjunctivae and sclerae normal and pupils equal, round, reactive to   light and accomodation  Ears: normal TM's and external ear canals both ears  Nose: no discharge, no sinus tenderness  Throat: lips, mucosa, and tongue normal; teeth and gums normal  Neck: no adenopathy, supple, symmetrical, trachea midline and thyroid normal  Back: symmetric, ROM normal. No CVA tenderness.  Lungs: clear to auscultation bilaterally  Breasts: normal appearance, no masses or tenderness  Heart : regular rate and rhythm and no murmurs  Abdomen:  bowel sounds normal, no masses palpable and soft, non-tender  Pelvic: external genitalia normal, vagina normal without discharge, cervix normal in appearance,   no cervical motion tenderness  Extremities: extremities normal, atraumatic, no cyanosis or edema  Pulses: 2+ and symmetric  Skin: Skin color, texture, turgor normal. " No rashes or lesions  Lymph nodes: Cervical, supraclavicular, and axillary nodes normal.  Neurologic: Grossly normal     Results for orders placed or performed in visit on 08/10/21   HPV High Risk Types DNA Cervical     Status: None   Result Value Ref Range    Other HR HPV Negative Negative    HPV16 DNA Negative Negative    HPV18 DNA Negative Negative    FINAL DIAGNOSIS       This patient's sample is negative for HPV DNA.        This test was developed and its performance characteristics determined by the [facility]. It has not been cleared or approved by the FDA. The laboratory is regulated under CLIA as qualified to perform high-complexity testing. This test is used for clinical purposes. It should not be regarded as investigational or for research.    METHODOLOGY: The Roche Praful 4800 system uses automated extraction, simultaneous amplification of HPV (L1 region) and beta-globin, followed by real time detection of fluorescent labeled HPV and beta globin using specific oligonucleotide probes. The test specifically identified types HPV 16 DNA and HPV 18 DNA while concurrently detecting the rest of the high risk types (31, 33, 35, 39, 45, 51, 52, 56, 58, 59, 66 or 68).    COMMENTS: This test is not intended for use as a screening device for woman under age 30 with normal cervical cytology. Results should be correlated with cytologic and histologic findings. Close clinical followup is recommended.       Pap thin layer screen with HPV - recommended age 30 - 65 years     Status: None   Result Value Ref Range    Interpretation        Negative for Intraepithelial Lesion or Malignancy (NILM)    Specimen Adequacy       Satisfactory for evaluation, endocervical/transformation zone component present    Clinical Information       none      LMP/Menopause Date       8/6/2021      HPV Reflex Yes regardless of result     Previous Abnormal?       No      Performing Labs  "      The technical component of this testing was completed at [facility]     Wet prep - Clinic Collect     Status: Abnormal    Specimen: Vagina; Swab   Result Value Ref Range    Trichomonas Absent Absent    Yeast Present (A) Absent    Clue Cells Absent Absent    WBCs/high power field 1+ (A) None             ASSESSMENT/PLAN:   Michell was seen today for physical.    Diagnoses and all orders for this visit:    Routine adult health maintenance    Screening for malignant neoplasm of cervix  -     Pap thin layer screen with HPV - recommended age 30 - 65 years  -     HPV High Risk Types DNA Cervical    Vaginal discharge  -     Wet prep - Clinic Collect    Yeast vaginitis  -     fluconazole (DIFLUCAN) 150 MG tablet; Take 1 tablet (150 mg) by mouth once for 1 dose May repeat in 3-7 days as needed for recurrent/persistent symptoms.    Screening for cardiovascular condition  -     Lipid panel reflex to direct LDL Fasting; Future  -     Basic metabolic panel; Future  -     CBC with platelets; Future    Need for hepatitis C screening test  -     Hepatitis C Screen Reflex to HCV RNA Quant and Genotype; Future    Other orders  -     REVIEW OF HEALTH MAINTENANCE PROTOCOL ORDERS        Patient has been advised of split billing requirements and indicates understanding: Yes  COUNSELING:  Reviewed preventive health counseling, as reflected in patient instructions       Regular exercise       Healthy diet/nutrition       Contraception       Family planning       Osteoporosis prevention/bone health       Colon cancer screening       Advance Care Planning    Estimated body mass index is 34.77 kg/m  as calculated from the following:    Height as of this encounter: 1.575 m (5' 2\").    Weight as of this encounter: 86.2 kg (190 lb 2 oz).    Weight management plan: Discussed healthy diet and exercise guidelines      Counseling Resources:  ATP IV Guidelines  Pooled Cohorts Equation Calculator  Breast Cancer " Risk Calculator  BRCA-Related Cancer Risk Assessment: FHS-7 Tool  FRAX Risk Assessment  ICSI Preventive Guidelines  Dietary Guidelines for Americans, 2010  USDA's MyPlate  ASA Prophylaxis  Lung CA Screening    Anastacia Herrera MD  [facility]

## 2021-08-13 ENCOUNTER — LAB (OUTPATIENT)
Dept: LAB | Facility: CLINIC | Age: 36
End: 2021-08-13
Payer: COMMERCIAL

## 2021-08-13 DIAGNOSIS — Z11.59 NEED FOR HEPATITIS C SCREENING TEST: ICD-10-CM

## 2021-08-13 DIAGNOSIS — Z13.6 SCREENING FOR CARDIOVASCULAR CONDITION: ICD-10-CM

## 2021-08-13 LAB
ANION GAP SERPL CALCULATED.3IONS-SCNC: 10 MMOL/L (ref 5–18)
BUN SERPL-MCNC: 13 MG/DL (ref 8–22)
CALCIUM SERPL-MCNC: 9.7 MG/DL (ref 8.5–10.5)
CHLORIDE BLD-SCNC: 104 MMOL/L (ref 98–107)
CHOLEST SERPL-MCNC: 196 MG/DL
CO2 SERPL-SCNC: 24 MMOL/L (ref 22–31)
CREAT SERPL-MCNC: 0.79 MG/DL (ref 0.6–1.1)
ERYTHROCYTE [DISTWIDTH] IN BLOOD BY AUTOMATED COUNT: 12.9 % (ref 10–15)
FASTING STATUS PATIENT QL REPORTED: NORMAL
GFR SERPL CREATININE-BSD FRML MDRD: >90 ML/MIN/1.73M2
GLUCOSE BLD-MCNC: 90 MG/DL (ref 70–125)
HCT VFR BLD AUTO: 43.2 % (ref 35–47)
HCV AB SERPL QL IA: NONREACTIVE
HDLC SERPL-MCNC: 54 MG/DL
HGB BLD-MCNC: 14.4 G/DL (ref 11.7–15.7)
LDLC SERPL CALC-MCNC: 115 MG/DL
MCH RBC QN AUTO: 29.4 PG (ref 26.5–33)
MCHC RBC AUTO-ENTMCNC: 33.3 G/DL (ref 31.5–36.5)
MCV RBC AUTO: 88 FL (ref 78–100)
PLATELET # BLD AUTO: 221 10E3/UL (ref 150–450)
POTASSIUM BLD-SCNC: 4.5 MMOL/L (ref 3.5–5)
RBC # BLD AUTO: 4.9 10E6/UL (ref 3.8–5.2)
SODIUM SERPL-SCNC: 138 MMOL/L (ref 136–145)
TRIGL SERPL-MCNC: 134 MG/DL
WBC # BLD AUTO: 5.4 10E3/UL (ref 4–11)

## 2021-08-13 PROCEDURE — 80048 BASIC METABOLIC PNL TOTAL CA: CPT

## 2021-08-13 PROCEDURE — 86803 HEPATITIS C AB TEST: CPT

## 2021-08-13 PROCEDURE — 36415 COLL VENOUS BLD VENIPUNCTURE: CPT

## 2021-08-13 PROCEDURE — 80061 LIPID PANEL: CPT

## 2021-08-13 PROCEDURE — 85027 COMPLETE CBC AUTOMATED: CPT

## 2021-08-16 LAB
HUMAN PAPILLOMA VIRUS 16 DNA: NEGATIVE
HUMAN PAPILLOMA VIRUS 18 DNA: NEGATIVE
HUMAN PAPILLOMA VIRUS FINAL DIAGNOSIS: NORMAL
HUMAN PAPILLOMA VIRUS OTHER HR: NEGATIVE

## 2021-08-18 LAB
BKR LAB AP GYN ADEQUACY: NORMAL
BKR LAB AP GYN INTERPRETATION: NORMAL
BKR LAB AP HPV REFLEX: NORMAL
BKR LAB AP LMP: NORMAL
BKR LAB AP PREVIOUS ABNORMAL: NORMAL
PATH REPORT.COMMENTS IMP SPEC: NORMAL
PATH REPORT.RELEVANT HX SPEC: NORMAL

## 2021-08-19 PROBLEM — Z98.890 H/O LEEP: Status: ACTIVE | Noted: 2021-08-19

## 2021-10-03 ENCOUNTER — HEALTH MAINTENANCE LETTER (OUTPATIENT)
Age: 36
End: 2021-10-03

## 2021-12-21 ENCOUNTER — OFFICE VISIT (OUTPATIENT)
Dept: FAMILY MEDICINE | Facility: CLINIC | Age: 36
End: 2021-12-21
Payer: COMMERCIAL

## 2021-12-21 VITALS
HEIGHT: 62 IN | BODY MASS INDEX: 36.46 KG/M2 | OXYGEN SATURATION: 98 % | DIASTOLIC BLOOD PRESSURE: 72 MMHG | WEIGHT: 198.13 LBS | SYSTOLIC BLOOD PRESSURE: 130 MMHG | HEART RATE: 72 BPM

## 2021-12-21 DIAGNOSIS — B37.31 YEAST VAGINITIS: ICD-10-CM

## 2021-12-21 DIAGNOSIS — N89.8 VAGINAL DISCHARGE: Primary | ICD-10-CM

## 2021-12-21 LAB
ALBUMIN UR-MCNC: NEGATIVE MG/DL
APPEARANCE UR: CLEAR
BILIRUB UR QL STRIP: NEGATIVE
CLUE CELLS: ABNORMAL
COLOR UR AUTO: YELLOW
GLUCOSE UR STRIP-MCNC: NEGATIVE MG/DL
HGB UR QL STRIP: NEGATIVE
KETONES UR STRIP-MCNC: NEGATIVE MG/DL
LEUKOCYTE ESTERASE UR QL STRIP: NEGATIVE
NITRATE UR QL: NEGATIVE
PH UR STRIP: 6 [PH] (ref 5–8)
SP GR UR STRIP: >=1.03 (ref 1–1.03)
TRICHOMONAS, WET PREP: ABNORMAL
UROBILINOGEN UR STRIP-ACNC: 0.2 E.U./DL
WBC'S/HIGH POWER FIELD, WET PREP: ABNORMAL
YEAST, WET PREP: PRESENT

## 2021-12-21 PROCEDURE — 99213 OFFICE O/P EST LOW 20 MIN: CPT | Performed by: FAMILY MEDICINE

## 2021-12-21 PROCEDURE — 87210 SMEAR WET MOUNT SALINE/INK: CPT | Performed by: FAMILY MEDICINE

## 2021-12-21 PROCEDURE — 81003 URINALYSIS AUTO W/O SCOPE: CPT | Performed by: FAMILY MEDICINE

## 2021-12-21 RX ORDER — FLUCONAZOLE 150 MG/1
150 TABLET ORAL WEEKLY
Qty: 6 TABLET | Refills: 0 | Status: SHIPPED | OUTPATIENT
Start: 2021-12-21 | End: 2022-01-26

## 2021-12-21 ASSESSMENT — MIFFLIN-ST. JEOR: SCORE: 1551.94

## 2021-12-21 NOTE — PROGRESS NOTES
"Assessment:         ICD-10-CM    1. Vaginal discharge  N89.8 Wet prep - Clinic Collect     Urinalysis Macroscopic - lab collect     Urinalysis Macroscopic - lab collect   2. Yeast vaginitis  B37.3 fluconazole (DIFLUCAN) 150 MG tablet         Plan:      We reviewed the potential etiologies for her vaginal symptoms and labs were sent as noted above.  We will cover with weekly diflucan for 6-8 wks as directed due to her recurrent symptoms. We reviewed other control measures, and she will call or return to clinic with any ongoing or worsening symptoms.       Subjective:            Michell Whiting is a 36 year old female who presents for evaluation of an abnormal vaginal discharge. Symptoms have been present for several mos intermittently. Symptoms seem to improve after treatment, but they don't seem to resolve completely. Vaginal symptoms: itching and irritation. She denies pelvic pain, odor or dysuria. Associated symptoms: none. Denies constipation, fever and chills.       Contraception: condoms. Sexually transmitted infection risk: very low. Menstrual flow: regular q 28-30 days. She does not have a history of recurrent bacterial infections. Almost every wet prep has come back positive for yeast, occasionally negative for everything.    The following portions of the patient's history were reviewed and updated as appropriate: allergies, current medications and problem list      Review of Systems  A comprehensive review of systems was negative.       Objective:     /72 (Patient Position: Sitting, Cuff Size: Adult Regular)   Pulse 72   Ht 1.575 m (5' 2\")   Wt 89.9 kg (198 lb 2 oz)   LMP 11/23/2021 (Exact Date)   SpO2 98%   BMI 36.24 kg/m      GEN: Alert and oriented, NAD, well nourished  SKIN:  Normal skin turgor, no lesions/rashes   HEENT: NC/AT, moist mucous membranes.    NECK: Normal.    CV: Regular rate and rhythm.   LUNGS: Clear. Normal respirations.   EXTREMITY: No edema, cyanosis  :  external " genitalia normal, thick white vaginal discharge in vaginal vault, cervix normal in appearance,  no cervical motion tenderness    NEURO: Grossly normal.          Results for orders placed or performed in visit on 12/21/21   Urinalysis Macroscopic - lab collect     Status: Normal   Result Value Ref Range    Color Urine Yellow Colorless, Straw, Light Yellow, Yellow    Appearance Urine Clear Clear    Glucose Urine Negative Negative mg/dL    Bilirubin Urine Negative Negative    Ketones Urine Negative Negative mg/dL    Specific Gravity Urine >=1.030 1.005 - 1.030    Blood Urine Negative Negative    pH Urine 6.0 5.0 - 8.0    Protein Albumin Urine Negative Negative mg/dL    Urobilinogen Urine 0.2 0.2, 1.0 E.U./dL    Nitrite Urine Negative Negative    Leukocyte Esterase Urine Negative Negative   Wet prep - Clinic Collect     Status: Abnormal    Specimen: Vagina; Swab   Result Value Ref Range    Trichomonas Absent Absent    Yeast Present (A) Absent    Clue Cells Absent Absent    WBCs/high power field 1+ (A) None

## 2022-01-28 DIAGNOSIS — B37.31 YEAST VAGINITIS: Primary | ICD-10-CM

## 2022-01-30 NOTE — TELEPHONE ENCOUNTER
"Outpatient Medication Detail     Disp Refills Start End CHELLY   fluconazole (DIFLUCAN) 150 MG tablet 6 tablet 0 2021 --   Sig - Route: Take 1 tablet (150 mg) by mouth once a week for 6 doses - Oral   Sent to pharmacy as: Fluconazole 150 MG Oral Tablet (DIFLUCAN)   Class: E-Prescribe   Order: [id number]   E-Prescribing Status: Receipt confirmed by pharmacy (2021  7:41 PM CST)     Routing refill request to provider for review/approval because:  Dose limited SIG.  Please clarify for longer term dosing.   script    Last office visit provider:  21     Requested Prescriptions   Pending Prescriptions Disp Refills     fluconazole (DIFLUCAN) 150 MG tablet [Pharmacy Med Name: FLUCONAZOLE 150MG TABS] 6 tablet 0     Sig: TAKE ONE TABLET BY MOUTH ONCE A WEEK       Antifungal Agents Failed - 2022  4:16 AM        Failed - Not Fluconazole or Terconazole      If oral Fluconazole or Terconazole, may refill if indicated in progress notes.           Passed - Recent (12 mo) or future (30 days) visit within the authorizing provider's specialty     Patient has had an office visit with the authorizing provider or a provider within the authorizing providers department within the previous 12 mos or has a future within next 30 days. See \"Patient Info\" tab in inbasket, or \"Choose Columns\" in Meds & Orders section of the refill encounter.              Passed - Medication is active on med list             Mak Rose RN 22 12:32 PM  "

## 2022-01-31 RX ORDER — FLUCONAZOLE 150 MG/1
TABLET ORAL
Qty: 6 TABLET | Refills: 0 | Status: SHIPPED | OUTPATIENT
Start: 2022-01-31